# Patient Record
Sex: FEMALE | Race: WHITE | NOT HISPANIC OR LATINO | URBAN - METROPOLITAN AREA
[De-identification: names, ages, dates, MRNs, and addresses within clinical notes are randomized per-mention and may not be internally consistent; named-entity substitution may affect disease eponyms.]

---

## 2020-10-23 ENCOUNTER — OFFICE VISIT (OUTPATIENT)
Dept: URBAN - METROPOLITAN AREA TELEHEALTH 2 | Facility: TELEHEALTH | Age: 65
End: 2020-10-23

## 2020-10-23 NOTE — HPI-TODAY'S VISIT:
The patient is due for a surveillance colonoscopy.    There is no recent history of rectal bleeding. The patient has no pertinent additional complaints of abdominal pain, constipation, diarrhea, bloating, rectal pain, anorexia or unintentional weight loss.   Regarding any upper GI complaints, the patient has not had heartburn, nausea, vomiting or dysphagia.   The patient does not take blood thinners.  They deny any CP or DEUTSCH.  Summary of prior GI workup: - A sister of hers apparently had a large colon polyp removed years ago. There is no FH of colon cancer. - Colonoscopy by me on 3/28/17 for screening purposes revealed a normal terminal ileum, a 5 mm tubular adenoma removed from the cecum, a 4 mm benign proximal transverse colon polyp, a 7 mm tubular adenoma in the descending colon, a 9 mm tubular adenoma in the sigmoid colon and a 5 mm TA in the rectum. There were also non-bleeding IHs.